# Patient Record
Sex: MALE | Race: WHITE | NOT HISPANIC OR LATINO | URBAN - METROPOLITAN AREA
[De-identification: names, ages, dates, MRNs, and addresses within clinical notes are randomized per-mention and may not be internally consistent; named-entity substitution may affect disease eponyms.]

---

## 2018-01-12 NOTE — PROGRESS NOTES
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  He has under-gone  a staged Fontan procedure, I.e. bidirectional Fareed Shunt  (SVC to RPA) subsequently  followed by completion of the total Cavo-Pulmonary Connection (IVC to RPA via a   tubular conduit).  The operations were performed at [facility] in [facility].   He also has an epicardial pacemaker for sinus node dysfunction.According to mom,   the pacemaker is currently not needed, i.e. has not been required.   Although I had been following Abdullahi carsonkevinly since birth, I have not seen him in recent  years.  I will need to procure his records from [facility].    In general, they report no: SOB, difficult breathing, CP, palpitations, abnormally slow or rapid HRs.    He has not had protein-loosing enteropathy. The patient is physically active.  No limitations  on activity. He recently had the flu, and is recovering.    Medications: None    ROS: No visual or hearing problems. He does use glasses. No HAs. No lightheadedness,   dizzyness or seizures.  No syncope. No swallowing disorder.  No bruits over the head. No JVD.  Mucus membranes are moist and pink.  Teeth appear normal. No carrotid bruits.  No thyroid disease. No DM. No known intrinsic problem with the lungs, liver or kidneys.  No abdominal pain and normal bowel movements. No pelvis pain and normal urination.  No joint pain, bruising or rashes. No cyanosis or edema.        PE:  Healthy looking, non-cyanotic 22 year in no distress.    WT  59.9 Kg (132 Lbs).   HT 1.765 m (5 ' 10 '').  Sat   97 %. HR  60 bpm.   / 76 mmHg.  HEENT:  Normal eye movements. No bruits over the head. No JVD.  Muscus membranes are moist and pink. No adenopathy. Neck is subtle.  CHEST: Well-healed mid-line scar. Normal chest movements with breathing.  Good air entry. Clear BSs. No wheezes, rhonchi or rales.  HEART:  Normal precordial activity. S1 and S2 are single.  No murmur.  No gallop,  clicks or rub.  ABD: Soft. Liver is at the RCM and non-tender. Normal BSs. No swelling. No bruising.  Pacemaker in the LL- quadrant.  EXT: Full ROM. No joint pain. No bruising, cycnosis or edema.                ECG: Low RA pacemaker.  Short HI. In lead 5 ? Delta wave.  T-wave abnormalities.      ECHO: No pericardial effusion. No clots or vegetations. Tricuspid atresia and membranous  VSD (~ 1 cm). PA is connected to a small RV.  The PA appears to be banded.  The mitral   valve is elongated.  The Ao valve has three leaflets. The Fontan extra cardiac baffle (3.7 x 4.0 cm) is in good position.  No fenestration seen. No clots. No Ao arch obstruction. PVs to the LA.    Measurements: LV 5.4 cm. Ao 3.1 cm.  LA 3.7 cm. Sep 1.0 cm.  PW 1.0 cm. EF 60%.    Doppler:  Mild PI.  Peak pressure drop across the MPA is 117 mmHg. Trace AI.  Peak  pressure drop across the Ao valve is 7 mmHg. Mild MR.  No fenestration seen in the conduit.  PVs to the LA.  Peak pressure drop in the upper DscAo is ~ 6 mmHg.            ASSESSMENT:  A 22 year old with tricuspid atresia, (S/P) Fontan procedure, I.e. TCPC.  Also had an epicardial pacemaker placed for sinus node dysfunction. Clinically is doing well.  No lasix has been required.  Good contractile function.        PLAN:    1. Start a baby ASA qd.  2. Stress test prior to leaving today.  Need to assess pacemaker and      chronotropic response.  3. Antibiotics required prior to dental procedure.  4. RTC in six months. ECHO and ECG.

## 2018-01-15 ENCOUNTER — CLINICAL SUPPORT (OUTPATIENT)
Dept: CARDIOLOGY | Facility: CLINIC | Age: 23
End: 2018-01-15
Payer: MEDICAID

## 2018-01-15 ENCOUNTER — OFFICE VISIT (OUTPATIENT)
Dept: CARDIOLOGY | Facility: CLINIC | Age: 23
End: 2018-01-15
Payer: MEDICAID

## 2018-01-15 VITALS
SYSTOLIC BLOOD PRESSURE: 128 MMHG | BODY MASS INDEX: 18.92 KG/M2 | HEART RATE: 67 BPM | HEIGHT: 70 IN | OXYGEN SATURATION: 97 % | DIASTOLIC BLOOD PRESSURE: 76 MMHG | WEIGHT: 132.19 LBS

## 2018-01-15 DIAGNOSIS — Q20.4 SINGLE VENTRICLE: ICD-10-CM

## 2018-01-15 DIAGNOSIS — Q20.4 SINGLE VENTRICLE: Primary | ICD-10-CM

## 2018-01-15 PROCEDURE — 93000 ELECTROCARDIOGRAM COMPLETE: CPT | Mod: S$GLB,,, | Performed by: PEDIATRICS

## 2018-01-15 PROCEDURE — 99214 OFFICE O/P EST MOD 30 MIN: CPT | Mod: S$GLB,,, | Performed by: PEDIATRICS

## 2018-01-15 PROCEDURE — 93325 DOPPLER ECHO COLOR FLOW MAPG: CPT | Mod: S$GLB,,, | Performed by: PEDIATRICS

## 2018-01-15 PROCEDURE — 93015 CV STRESS TEST SUPVJ I&R: CPT | Mod: S$GLB,,, | Performed by: PEDIATRICS

## 2018-01-15 PROCEDURE — 93303 ECHO TRANSTHORACIC: CPT | Mod: S$GLB,,, | Performed by: PEDIATRICS

## 2018-02-01 ENCOUNTER — INITIAL CONSULT (OUTPATIENT)
Dept: PEDIATRIC CARDIOLOGY | Facility: CLINIC | Age: 23
End: 2018-02-01
Attending: PEDIATRICS
Payer: MEDICAID

## 2018-02-01 ENCOUNTER — OFFICE VISIT (OUTPATIENT)
Dept: CARDIOLOGY | Facility: CLINIC | Age: 23
End: 2018-02-01
Payer: MEDICAID

## 2018-02-01 ENCOUNTER — TELEPHONE (OUTPATIENT)
Dept: CARDIOLOGY | Facility: CLINIC | Age: 23
End: 2018-02-01

## 2018-02-01 VITALS
SYSTOLIC BLOOD PRESSURE: 130 MMHG | OXYGEN SATURATION: 97 % | WEIGHT: 132.5 LBS | HEART RATE: 68 BPM | BODY MASS INDEX: 19.29 KG/M2 | DIASTOLIC BLOOD PRESSURE: 75 MMHG

## 2018-02-01 DIAGNOSIS — I49.5 SINUS NODE DYSFUNCTION: Primary | ICD-10-CM

## 2018-02-01 DIAGNOSIS — Q24.9 ADULT CONGENITAL HEART DISEASE: ICD-10-CM

## 2018-02-01 DIAGNOSIS — Q20.4 SINGLE VENTRICLE WITH DOUBLE INLET LEFT VENTRICLE: Primary | ICD-10-CM

## 2018-02-01 DIAGNOSIS — R00.1 BRADYCARDIA: ICD-10-CM

## 2018-02-01 DIAGNOSIS — Z45.018 PACEMAKER END OF LIFE: ICD-10-CM

## 2018-02-01 DIAGNOSIS — R00.2 PALPITATION: Primary | ICD-10-CM

## 2018-02-01 DIAGNOSIS — I49.5 SINUS NODE DYSFUNCTION: ICD-10-CM

## 2018-02-01 DIAGNOSIS — Z98.890 S/P FONTAN PROCEDURE: ICD-10-CM

## 2018-02-01 PROCEDURE — 3008F BODY MASS INDEX DOCD: CPT | Mod: S$GLB,,, | Performed by: PEDIATRICS

## 2018-02-01 PROCEDURE — 93000 ELECTROCARDIOGRAM COMPLETE: CPT | Mod: 59,S$GLB,, | Performed by: PEDIATRICS

## 2018-02-01 PROCEDURE — 93227 XTRNL ECG REC<48 HR R&I: CPT | Mod: S$GLB,,, | Performed by: PEDIATRICS

## 2018-02-01 PROCEDURE — 93288 INTERROG EVL PM/LDLS PM IP: CPT | Mod: 26,S$GLB,, | Performed by: PEDIATRICS

## 2018-02-01 PROCEDURE — 99215 OFFICE O/P EST HI 40 MIN: CPT | Mod: 25,S$GLB,, | Performed by: PEDIATRICS

## 2018-02-01 PROCEDURE — 99212 OFFICE O/P EST SF 10 MIN: CPT | Mod: 25,S$GLB,, | Performed by: PEDIATRICS

## 2018-02-01 NOTE — PROGRESS NOTES
"HPI:   This patient is a 22 year old born with tricuspid atresia and VSD.  Initially, he had a PA band. He subsequently underwent a staged Fontan procedure, i.e. bidirectional Fareed Shunt  (SVC to RPA) followed by completion of the total Cavo-Pulmonary Connection (IVC to RPA via a tubular conduit).  The operations were performed at [facility] in [facility]. He also has an epicardial pacemaker for sinus node dysfunction. According to mom, the pacemaker is currently "not needed", i.e. has not been required.  Although I had been following Abdullahi carsonkevinly since birth, I have not seen him in recent years.  I will need to procure his records from [facility].  In general, the family reports no: SOB, difficult breathing, CP, palpitations, abnormally slow or rapid HRs.  He has not had protein-loosing enteropathy. The patient is physically active.  No limitations on activity.  He is currently on no medications: He is here today for a regular cardiology follow-up, and to assess the status of the pacemaker by the EP team.     ROS: No visual or hearing problems. He does use glasses. No HAs. No lightheadedness,   dizzyness or seizures.  No syncope. No swallowing disorder.  No bruits over the head. No JVD.  Mucus membranes are moist and pink.  Teeth appear normal. No carrotid bruits.  No thyroid disease. No DM. No known intrinsic problem with the lungs, liver or kidneys.  No abdominal pain and normal bowel movements. No pelvis pain and normal urination.  No joint pain, bruising or rashes. No cyanosis or edema.          PE:  Healthy looking, non-cyanotic 22 year in no distress.     WT  60.1 Kg (132 Lbs).   HT 1.765 m (5 ' 10 '').  Sat   97 %. HR  68 bpm.   / 75 mmHg.  HEENT:  Normal eye movements. No bruits over the head. No JVD.  Muscus membranes are moist and pink. No adenopathy. Neck is subtle.  CHEST: Well-healed mid-line scar. Normal chest movements with breathing.  Good air entry. Clear BSs. No wheezes, " rhonchi or rales.  HEART:  Normal precordial activity. S1 and S2 are single.  No murmur.  No gallop, clicks or rub.  ABD: Soft. Liver is at the RCM and non-tender and non-pulsatile. Normal BSs. No swelling.   Pacemaker palpated in the LL- quadrant.  EXT: Full ROM. No joint pain. No bruising, cycnosis or edema.  Cap refill is good.              ECG: Low RA pacemaker.  Short WV. T-wave abnormalities.        ECHO (From previous visit): No pericardial effusion. No clots or vegetations. Tricuspid atresia and membranous  VSD (~ 1 cm). PA is still connected to a small RV.  The PA appears to be banded and there is antegrade flow.  The mitral   valve is elongated.  The Ao valve has three leaflets. The Fontan extra cardiac baffle (3.7 x 4.0 cm) is in good position.  No fenestration seen. No clots. No Ao arch obstruction. PVs to the LA.     Measurements: LV 5.4 cm. Ao 3.1 cm.  LA 3.7 cm. Sep 1.0 cm.  PW 1.0 cm. EF 60%.     Doppler:  Mild PI.  Peak pressure drop across the MPA is 117 mmHg. Trace AI.  Peak  pressure drop across the Ao valve is 7 mmHg. Mild MR.  No fenestration seen in the conduit.  PVs to the LA.  Peak pressure drop in the upper DscAo is ~ 6 mmHg.                 ASSESSMENT:  A 22 year old with tricuspid atresia, (S/P) Fontan procedure, i.e. TCPC. Residual antegrade flow across the tightened PA band.   Abdullahi had an epicardial pacemaker placed several years back for sinus node dysfunction. Clinically he is doing well. To date, no Lasix has   been required. The LV contractile function is good.           PLAN:     1. Start a baby ASA qd.  Consider starting Enalapril 2.5 mg qd.  2. Need to assess pacemaker and chronotropic response.  3. Antibiotics prophylaxis required prior to any dental procedure.  4. RTC in six months. ECHO and ECG.      Stanislav Rogel PhD, MD

## 2018-02-01 NOTE — PROGRESS NOTES
"Holter placed today  Will monitor for symptoms  Holding off on generator change at present.    Low right atrial rhythm, left axis deviation, nonspecific ST and T wave abnormality    Thank you for referring your patient Abdullahi Muñiz to the electrophysiology clinic for consultation. The patient is accompanied by his mother. Please review my findings below.    CHIEF COMPLAINT: EP evaluation of bradycardia and history of pacemaker.    HISTORY OF PRESENT ILLNESS:   22 year old born with tricuspid atresia and VSD.  Initially, he had a PA band. He subsequently underwent a staged Fontan procedure, i.e. bidirectional Fareed Shunt  (SVC to RPA) followed by completion of the total Cavo-Pulmonary Connection (IVC to RPA via a tubular conduit).  The operations were performed at [facility] in [facility]. He also has an epicardial pacemaker for sinus node dysfunction. According to mom, the pacemaker is currently "not needed", i.e. has not been required.  He was lost to follow up and presented today to see Dr. Rogel for first time in a long while.  He denies fatigue or activity intolerance.  He denies syncope or near syncope.  He denies palpitations or chest pain.  He denies difficulty breathing.    REVIEW OF SYSTEMS:     GENERAL: No fever, chills, fatigability or weight loss.  SKIN: No rashes, itching or changes in color or texture of skin.  CHEST: Denies TRUONG, cyanosis, wheezing, cough and sputum production.  CARDIOVASCULAR: see HPI  ABDOMEN: Appetite fine. No weight loss. Denies diarrhea, abdominal pain, or vomiting.  PERIPHERAL VASCULAR: No claudication or cyanosis.  MUSCULOSKELETAL: No joint stiffness or swelling.   NEUROLOGIC: No history of seizures,  alteration of gait or coordination.    PAST MEDICAL HISTORY:   No past medical history on file.      FAMILY HISTORY:   No family history on file.      SOCIAL HISTORY:   Social History     Social History    Marital status: Single     Spouse name: N/A    Number of " children: N/A    Years of education: N/A     Occupational History    Not on file.     Social History Main Topics    Smoking status: Not on file    Smokeless tobacco: Not on file    Alcohol use Not on file    Drug use: Unknown    Sexual activity: Not on file     Other Topics Concern    Not on file     Social History Narrative    No narrative on file       ALLERGIES:  Review of patient's allergies indicates:  No Known Allergies    MEDICATIONS:  No current outpatient prescriptions on file.      PHYSICAL EXAM:   Vitals:    02/01/18 1128   BP: 130/75   Pulse: 68   SpO2: 97%   Weight: 60.1 kg (132 lb 7.9 oz)         GENERAL: Awake, well-developed well-nourished, no apparent distress  HEENT: mucous membranes moist and pink, normocephalic atraumatic, no cranial or carotid bruits, sclera anicteric  NECK: no jugular venous distention, no lymphadenopathy  CHEST: Good air movement, clear to auscultation bilaterally  CARDIOVASCULAR: Quiet precordium, regular rate and rhythm, S1S2, no murmurs rubs or gallops  ABDOMEN: Soft, nontender nondistended, no hepatomegaly, no aortic bruits  EXTREMITIES: Warm well perfused, 2+ radial/pedal pulses, capillary refill 2 seconds, no clubbing, cyanosis, or edema  NEURO: Alert and oriented, cooperative with exam, face symmetric, moves all extremities well    STUDIES:  ECG:  Low right atrial rhythm, short RI interval, nonspecific T wave abnormalities  PACEMAKER:    MD[employer] [place] [address] DR RUIZ# [id number] DOI 6/17/2001- unable to interrogate device- probable EOL.      ASSESSMENT:  Encounter Diagnoses   Name Primary?    Sinus node dysfunction      23 y/o male with congenital heart disease s/p Fontan with history of epicardial pacemaker.  His pacemaker is no longer functioning.  Unclear whether the leads are functional and battery is completely drained or if the leads are not functional as well.  He reports being asymptomatic at present in-spite of pacer not working.    PLAN:   - Recommended Holter  to evaluate min, average, and max heart rate.  - Continue to observe for now.  - Discussed that we could attempt generator change if patient decides he would like to or if he demonstrates need based on symptoms or other clinical signs.  - SBE prophylaxis as per Dr. Rogel.  - Call for syncope, near syncope, chest pain, palpitations or any other questions or concerns.  - F/u in 6 months in EP clinic coordinated with visit with Dr. Rogel.        Time Spent: 50 (min) with over 50% in direct patient and family consultation regarding evaluation and management of congenital heart disease and history of epicardial pacemaker.      The patient's doctor will be notified via EPIC/FAX    I hope this brings you up-to-date on Abdullahi Muñiz  Please contact me with any questions or concerns.    Robe Echeverria MD  Pediatric and Adult Congenital Electrophysiologist  Pediatric Cardiologist

## 2018-02-06 NOTE — PROGRESS NOTES
Exercise stress test result    Abdullahi Muñiz underwent an exercise stress test on January 15, 2018.  He exercised 11 minutes and 1 second of the Ifeanyi protocol, which is 2 minutes and 1 second into stage IV, demonstrating very good exercise tolerance for age.  The test was stopped due to fatigue and coughing, and he experienced emesis after the test.  Baseline rhythm was low right atrial rhythm at 65 bpm patient probably went into sinus rhythm in stage II which continued throughout exercise.  In early recovery, patient reverted back to low right atrial rhythm at 118 bpm.  Low right atrial rhythm continued throughout recovery.  Baseline heart rate was 65 bpm as mentioned.  At peak exercise, heart rate increased to only 135 bpm, lower than expected.  At the end of recovery, heart rate was 69 bpm.  Occasional PACs occurred in recovery, and no other rhythm disturbances occurred.  Baseline blood pressure was 121/81 mmHg, and increased to 183/103 mmHg 1 minute into recovery after 7 minutes of recovery, blood pressure was 123/94 mmHg.  At the end of recovery, blood pressure was 126/74 mmHg.  Baseline duration's were 98%, and decreased to 90% at peak exercise.  Saturations increased to 95% 3 minutes into recovery, and 98% 5 minutes into recovery.  There were no ST segment changes.  Impression: Very good level of exercise achieved for age.  Baseline rhythm and post exercise rhythms were both low right atrial rhythm, however patient converted to sinus rhythm during exercise.  Subnormal heart rate achieved at peak exercise.  Moderate desaturations to 90% at peak exercise before recovering back to 98%.  Excessive blood pressure response to exercise.  Rare PACs in recovery with no other arrhythmias or ST segment changes.

## 2018-02-13 PROBLEM — Z98.890 S/P FONTAN PROCEDURE: Status: ACTIVE | Noted: 2018-02-13

## 2018-02-13 PROBLEM — R00.1 BRADYCARDIA: Status: ACTIVE | Noted: 2018-02-13

## 2018-02-13 PROBLEM — Q24.9 ADULT CONGENITAL HEART DISEASE: Status: ACTIVE | Noted: 2018-02-13

## 2018-02-13 PROBLEM — Z45.018 PACEMAKER END OF LIFE: Status: ACTIVE | Noted: 2018-02-13

## 2019-01-31 DIAGNOSIS — Q24.9 ADULT CONGENITAL HEART DISEASE: ICD-10-CM

## 2019-01-31 DIAGNOSIS — R00.1 BRADYCARDIA: Primary | ICD-10-CM

## 2019-01-31 DIAGNOSIS — Z98.890 S/P FONTAN PROCEDURE: ICD-10-CM

## 2019-02-14 NOTE — TELEPHONE ENCOUNTER
"HPI:   This patient, Abdullahi Muñiz,  is a 23 year old  male, who was born with tricuspid atresia and VSD.  Initially, he had a PA band. He subsequently underwent a staged Fontan procedure, i.e. bidirectional Fareed Shunt (SVC to RPA) followed by completion of the Total Cavo-Pulmonary Connection (IVC to RPA via a non-fenestrated tubular conduit).  The operations were performed at [facility] in [facility]. He also has an epicardial pacemaker for sinus node dysfunction. According to mom, the pacemaker is currently "not needed", i.e. has not been required.  Although I had been following Abdullahi chavez since birth, I have not seen him in recent years.  In general, thefamily reports no: SOB, difficult breathing, CP, palpitations, abnormally slow or rapid HRs.  He has not had protein-loosing enteropathy or desaturations.. The patient is physically active.  No limitations on activity.  He is currently on no medications: He is here today for a regular cardiology follow-up, and to assess the status of the pacemaker by the EP team.     ROS: No visual or hearing problems. He does use glasses. No HAs. No lightheadedness, dizzyness or seizures.  No syncope. No swallowing disorder. No DIONICIO.  No bruits over the head. No JVD. Mucus membranes are moist and pink.  Teeth appear normal. No carotid bruits.  No thyroid disease. No DM. No known intrinsic problem with the lungs, liver or kidneys.  No abdominal pain and normal bowel movements. No pelvis pain and normal urination. No joint pain, bruising or rashes. No cyanosis, rashes or edema.          PE:  Healthy looking, non-cyanotic 23 year in no distress.  WT  60.1 Kg (132 Lbs).   HT 1.765 m (5 ' 10 '').  Sat   97 %. HR  68 bpm.   / 75 mmHg.  HEENT:  Normal eye movements. No bruits over the head. No JVD.  Muscus membranes are moist and pink. No adenopathy. Neck is subtle. Thyroid is not enlarged.  CHEST: Well-healed mid-line scar. Normal chest movements with " breathing. Good air entry. Clear BSs. No wheezes, rhonchi or rales. HEART:  Normal precordial activity. S1 and S2 are single.  No murmur.  No gallop, clicks or rub.  ABD: Soft. Liver is at the RCM and non-tender and non-pulsatile. Normal BSs. No swelling. Pacemaker palpated in the LL- quadrant.  EXT: Full ROM. No joint pain. No bruising, cyanosis or edema.  Cap refill is good.         ....................................................................................................       ECG: Low RA pacemaker.  Short TN. T-wave abnormalities.        ECHO (From today): No pericardial effusion. No clots or vegetations. Tricuspid atresia and membranous VSD (~ 1 cm). PA is still connected to a small RV.  The PA appears to be banded and there is antegrade flow.  The mitral valve is elongated. The Ao valve has three leaflets. The Fontan extra cardiac baffle (3.7 x 4.0 cm) is in good position. No fenestration seen. No Ao arch obstruction. PVs to the LA.  M-MODE Measurements: LV 5.4 cm. Ao 3.1 cm.  LA 3.7 cm. Sep 1.0 cm.  PW 1.0 cm. LV-EF 60%.  DOPPLER:  Mild PI.  Peak pressure drop across the MPA is 117 mmHg. Trace AI.  Peakpressure drop across the Ao valve is 7 mmHg. Mild MR.  No fenestration seen in the conduit.  PVs to the LA.  Peak pressure drop in the upper DscAo is ~ 6 mmHg.        ........................................................................................................         ASSESSMENT:  A 23 year old with tricuspid atresia, (S/P) Fontan procedure, i.e. TCPC. Residual antegrade flow across the tightened PA band. Abdullahi had an epicardial pacemaker placed several years back for sinus node dysfunction.  Non-functional now. Clinically he is doing well. To date, no Lasix has been required. The LV contractile function is good.  PLAN:   1. A baby ASA qd.  Consider starting Enalapril 2.5 mg qd.  2. Need to assess pacemaker and chronotropic response.  3. Antibiotics prophylaxis required prior to any  dental procedure. 4. RTC in six months. ECHO and ECG.  Stanislav Rogel  PhD, MD (Cell ).

## 2023-02-24 ENCOUNTER — RESEARCH ENCOUNTER (OUTPATIENT)
Dept: RESEARCH | Facility: HOSPITAL | Age: 28
End: 2023-02-24
Payer: MEDICAID

## 2023-02-24 NOTE — PROGRESS NOTES
Date:2/24/2023   Trial: CARIE, 2021.237  PI: Dr. Combs  Contacted By: Yulissa Kevin    I attempted to contact the patient the first time via Phone Call to let them know about the research opportunity CLEMENCIAGordoARISTIDES Study. (Congenital Heart Initiative - Redefining Outcomes and Navigation to Adult Centered Care).  This study is looking to improve care for future adult congenital heart defect patients. The trial consists of surveys periodically that the patient completes independently. I left a voicemail with an email and number to contact us if they are interested in joining the study.         Please contact KENYETTA Nolasco or Dr. Mohit Combs for questions.   Trial Number: [id number]  Trial Email: [email or web address]

## 2023-03-06 ENCOUNTER — RESEARCH ENCOUNTER (OUTPATIENT)
Dept: RESEARCH | Facility: HOSPITAL | Age: 28
End: 2023-03-06
Payer: MEDICAID

## 2023-03-06 NOTE — PROGRESS NOTES
Date:3/6/2023   Trial: CARIE, 2021.237  PI: Dr. Combs  Contacted By: Yulissa Kevin    I attempted to contact the patient the second time via Phone Call to let them know about the research opportunity CLEMENCIAGordoARISTIDES Study. (Congenital Heart Initiative - Redefining Outcomes and Navigation to Adult Centered Care).  This study is looking to improve care for future adult congenital heart defect patients. The trial consists of surveys periodically that the patient completes independently. I left a voicemail with an email and number to contact us if they are interested in joining the study.         Please contact KENYETTA Nolasoc or Dr. Mohit Combs for questions.   Trial Number: [id number]  Trial Email: [email or web address]

## 2023-03-13 ENCOUNTER — RESEARCH ENCOUNTER (OUTPATIENT)
Dept: RESEARCH | Facility: HOSPITAL | Age: 28
End: 2023-03-13
Payer: MEDICAID

## 2023-03-13 NOTE — PROGRESS NOTES
Date:3/13/2023   Trial: CARIE, 2021.237  PI: Dr. Combs  Contacted By: Yulissa Kevin    I attempted to contact the patient the third time via Phone Call to let them know about the research opportunity CLEMENCIAGordoARISTIDES Study. (Congenital Heart Initiative - Redefining Outcomes and Navigation to Adult Centered Care).  This study is looking to improve care for future adult congenital heart defect patients. The trial consists of surveys periodically that the patient completes independently. I left a voicemail with an email and number to contact us if they are interested in joining the study.         Please contact KENYETTA Nolasco or Dr. Mohit Combs for questions.   Trial Number: [id number]  Trial Email: [email or web address]